# Patient Record
Sex: MALE | Race: ASIAN | NOT HISPANIC OR LATINO | Employment: FULL TIME | URBAN - METROPOLITAN AREA
[De-identification: names, ages, dates, MRNs, and addresses within clinical notes are randomized per-mention and may not be internally consistent; named-entity substitution may affect disease eponyms.]

---

## 2020-10-21 ENCOUNTER — HOSPITAL ENCOUNTER (OUTPATIENT)
Dept: CT IMAGING | Facility: HOSPITAL | Age: 35
Discharge: HOME | End: 2020-10-21
Admitting: INTERNAL MEDICINE

## 2020-10-21 ENCOUNTER — TRANSCRIBE ORDERS (OUTPATIENT)
Dept: ADMINISTRATIVE | Facility: HOSPITAL | Age: 35
End: 2020-10-21

## 2020-10-21 DIAGNOSIS — R07.9 CHEST PAIN, UNSPECIFIED TYPE: ICD-10-CM

## 2020-10-21 DIAGNOSIS — R07.9 CHEST PAIN, UNSPECIFIED TYPE: Primary | ICD-10-CM

## 2020-10-21 LAB — CREAT BLDA-MCNC: 0.8 MG/DL (ref 0.6–1.3)

## 2020-10-21 PROCEDURE — 82565 ASSAY OF CREATININE: CPT

## 2020-10-21 PROCEDURE — 71275 CT ANGIOGRAPHY CHEST: CPT

## 2020-10-21 PROCEDURE — 0 IOPAMIDOL PER 1 ML: Performed by: INTERNAL MEDICINE

## 2020-10-21 RX ORDER — CLOMIPRAMINE HYDROCHLORIDE 25 MG/1
25 CAPSULE ORAL
COMMUNITY

## 2020-10-21 RX ORDER — ARIPIPRAZOLE 10 MG/1
5 TABLET ORAL DAILY
COMMUNITY

## 2020-10-21 RX ADMIN — IOPAMIDOL 95 ML: 755 INJECTION, SOLUTION INTRAVENOUS at 13:47

## 2020-10-21 NOTE — NURSING NOTE
"Dr Ramos called and states \"No PE\". Pt informed and has no questions at present. Encouraged pt to call Dr Fernandez for further instructions regarding the cause of his concern. NAD noted. Pt left Radiology ambulatory.  "

## 2021-02-09 ENCOUNTER — OFFICE VISIT (OUTPATIENT)
Dept: FAMILY MEDICINE CLINIC | Facility: CLINIC | Age: 36
End: 2021-02-09

## 2021-02-09 VITALS
WEIGHT: 203.5 LBS | DIASTOLIC BLOOD PRESSURE: 78 MMHG | SYSTOLIC BLOOD PRESSURE: 108 MMHG | HEIGHT: 68 IN | TEMPERATURE: 97.3 F | OXYGEN SATURATION: 98 % | HEART RATE: 72 BPM | BODY MASS INDEX: 30.84 KG/M2

## 2021-02-09 DIAGNOSIS — R06.83 SNORING: ICD-10-CM

## 2021-02-09 DIAGNOSIS — Z00.00 ROUTINE GENERAL MEDICAL EXAMINATION AT A HEALTH CARE FACILITY: Primary | ICD-10-CM

## 2021-02-09 DIAGNOSIS — R07.89 ATYPICAL CHEST PAIN: ICD-10-CM

## 2021-02-09 DIAGNOSIS — F42.9 OBSESSIVE-COMPULSIVE DISORDER, UNSPECIFIED TYPE: ICD-10-CM

## 2021-02-09 DIAGNOSIS — R53.83 OTHER FATIGUE: ICD-10-CM

## 2021-02-09 DIAGNOSIS — Z13.1 SCREENING FOR DIABETES MELLITUS: ICD-10-CM

## 2021-02-09 PROCEDURE — 99385 PREV VISIT NEW AGE 18-39: CPT | Performed by: NURSE PRACTITIONER

## 2021-02-09 PROCEDURE — 93000 ELECTROCARDIOGRAM COMPLETE: CPT | Performed by: NURSE PRACTITIONER

## 2021-02-09 RX ORDER — CLONAZEPAM 0.5 MG/1
0.25 TABLET ORAL
COMMUNITY
Start: 2021-01-11

## 2021-02-09 NOTE — PROGRESS NOTES
Chief Complaint  Annual Exam (NP Est, Wants labs and CPE. C/o chest pain thats has happened since a couple of years ago but its gotten worst.)    Subjective          Salud Edwards presents to [facility]  History of Present Illness     New patient here to [facility] care for physical.  He is concerned because he knows his BMI is too high and he does not exercise as he should.  He admits that he does not eat as healthy as he should.      Moved here from [facility].  Lives with his spouse and works for [employer]. Works  from home as does his spouse.  He has no kids, but would like to have kids.  Denies pets.      No formal exercise program.  He and spouse went hiking this past weekend and it was pretty rigorous and had not chest pain with this.  He did notice his RR and HR went pretty high during exertion.      He snores at night, never had sleep apnea test.  He is sleepy after work and takes appr 1 hour nap before he can do much activity afterward.  He feels like he does not have the energy his friends seem to have.      Appr 3 yrs ago had episode of CP with diaphoresis in BP that lasted few minutes and resolved on own.  He has had several episodes since then, but none as severe.  Has had multiple UC visits for various types of CP.  Never seen by cardiology.      Has PMH OCD which is well managed on current meds by psychiatry.     Has strong FH of CVD disease and diabetes on both mom and dad side.  Dad had MI in 2018.     Denies FH breast, ovarian, colon, prostate cancer.  Dad did have prostatectomy but no cancer found.     Smokes 1-2 cigs/day.  Not ready to quit. Denies etoh, recreational drugs.      Enjoys photography.   Review of Systems   Constitutional: Negative for activity change, appetite change and fatigue.   HENT: Negative for congestion, ear pain, hearing loss, rhinorrhea, sore throat and trouble swallowing.    Eyes: Negative for pain and visual disturbance.   Respiratory:  "Positive for shortness of breath. Negative for cough and wheezing.         Sometimes feels like it is difficult to take deep breath   Cardiovascular: Positive for chest pain. Negative for palpitations and leg swelling.   Gastrointestinal: Negative for abdominal pain, blood in stool, constipation, diarrhea, nausea and vomiting.   Endocrine: Negative for cold intolerance, heat intolerance, polydipsia and polyuria.   Genitourinary: Negative for difficulty urinating, dysuria and hematuria.   Musculoskeletal: Negative for arthralgias, gait problem, joint swelling and myalgias.   Neurological: Negative for dizziness, weakness, light-headedness and numbness.     Objective   Vital Signs:   /78   Pulse 72   Temp 97.3 °F (36.3 °C)   Ht 172.7 cm (68\")   Wt 92.3 kg (203 lb 8 oz)   SpO2 98%   BMI 30.94 kg/m²     Physical Exam  Vitals signs and nursing note reviewed.   Constitutional:       General: He is not in acute distress.     Appearance: He is well-developed. He is obese. He is not ill-appearing.   HENT:      Head: Normocephalic and atraumatic.      Right Ear: Tympanic membrane, ear canal and external ear normal.      Left Ear: Tympanic membrane, ear canal and external ear normal.      Mouth/Throat:      Mouth: Mucous membranes are moist.      Pharynx: Uvula midline. No posterior oropharyngeal erythema.   Eyes:      General: No scleral icterus.        Right eye: No discharge.         Left eye: No discharge.      Extraocular Movements: Extraocular movements intact.      Conjunctiva/sclera: Conjunctivae normal.      Pupils: Pupils are equal, round, and reactive to light.   Neck:      Musculoskeletal: Neck supple.      Thyroid: No thyromegaly.   Cardiovascular:      Rate and Rhythm: Normal rate and regular rhythm.      Heart sounds: Normal heart sounds. No murmur.   Pulmonary:      Effort: Pulmonary effort is normal.      Breath sounds: Normal breath sounds.   Abdominal:      General: Bowel sounds are normal.      " Palpations: Abdomen is soft.      Tenderness: There is no abdominal tenderness.   Musculoskeletal:         General: No deformity.      Comments: Gait smooth and steady   Lymphadenopathy:      Cervical: No cervical adenopathy.   Skin:     General: Skin is warm and dry.   Neurological:      General: No focal deficit present.      Mental Status: He is alert and oriented to person, place, and time.   Psychiatric:         Mood and Affect: Mood normal.         Behavior: Behavior normal.        Result Review :            ECG 12 Lead    Date/Time: 2/9/2021 9:47 AM  Performed by: Jennifer Lehman APRN  Authorized by: Jennifer Lehman APRN   Comparison: not compared with previous ECG   Previous ECG: no previous ECG available  Rhythm: sinus rhythm  Rate: normal  Conduction: conduction normal  T Waves: T waves normal  QRS axis: normal  Other findings: non-specific ST-T wave changes    Clinical impression: non-specific ECG              Assessment and Plan    There are no diagnoses linked to this encounter.       Will get labs today.      I do recommend sleep study, pt was not willing to do this at this time.  Will consider.  EKG was not specific-no other EKGs to compare.  Reports of EKGs at [facility] visits reviewed and no specific problematic EKGs in past.  Given his anxiety, FH,  and multiple visits for various types of chest pain it may not be a bad idea to have him see a cardiologist. We will discuss this again after labs reviewed.      As part of wellness and prevention, the following topics were discussed with the patient:   Nutrition-diet high in fresh/fozen veges, lower in carbs, unsaturated fats, and higher in lean proteins, adequate hydration   Physical activity/regular exercise-150 mins per week of moderate activity that increases HR and is enjoyable to lower stress and improve CVD     Healthy weight-goal BMI discussed    Injury prevention-covid safety, back and joint health    Substance misuse/abuse-health  consequences of tobacco discussed, not ready to quit, encouraged stopping    Sexual behavior-discussed mgmt and eval of occasional ED and premature ejaculation-will discuss again at next visit   Dental health-recommend twice per year dental cleans and daily flossing to lower inflammation in body   Mental health-stress mgmt and sleep hygiene   Immunization-recommend covid vaccine when available, declines Tdap, flu, pneumonia today.  Has had BCG in past  Recommend sleep study-health consequences of uncontrolled sleep apnea discussed      Follow Up   No follow-ups on file.  Patient was given instructions and counseling regarding his condition or for health maintenance advice. Please see specific information pulled into the AVS if appropriate.

## 2021-02-10 LAB
25(OH)D3+25(OH)D2 SERPL-MCNC: 25.4 NG/ML (ref 30–100)
ALBUMIN SERPL-MCNC: 4.7 G/DL (ref 3.5–5.2)
ALBUMIN/GLOB SERPL: 1.9 G/DL
ALP SERPL-CCNC: 100 U/L (ref 39–117)
ALT SERPL-CCNC: 42 U/L (ref 1–41)
AST SERPL-CCNC: 27 U/L (ref 1–40)
BASOPHILS # BLD AUTO: ABNORMAL 10*3/UL
BILIRUB SERPL-MCNC: 0.3 MG/DL (ref 0–1.2)
BUN SERPL-MCNC: 14 MG/DL (ref 6–20)
BUN/CREAT SERPL: 16.1 (ref 7–25)
CALCIUM SERPL-MCNC: 9.4 MG/DL (ref 8.6–10.5)
CHLORIDE SERPL-SCNC: 101 MMOL/L (ref 98–107)
CHOLEST SERPL-MCNC: 261 MG/DL (ref 0–200)
CO2 SERPL-SCNC: 27.4 MMOL/L (ref 22–29)
CREAT SERPL-MCNC: 0.87 MG/DL (ref 0.76–1.27)
DIFFERENTIAL COMMENT: ABNORMAL
EOSINOPHIL # BLD AUTO: ABNORMAL 10*3/UL
EOSINOPHIL # BLD MANUAL: 0.22 10*3/MM3 (ref 0–0.4)
EOSINOPHIL NFR BLD AUTO: ABNORMAL %
EOSINOPHIL NFR BLD MANUAL: 3.1 % (ref 0.3–6.2)
ERYTHROCYTE [DISTWIDTH] IN BLOOD BY AUTOMATED COUNT: 15.8 % (ref 12.3–15.4)
GLOBULIN SER CALC-MCNC: 2.5 GM/DL
GLUCOSE SERPL-MCNC: 84 MG/DL (ref 65–99)
HBA1C MFR BLD: 5.7 % (ref 4.8–5.6)
HCT VFR BLD AUTO: 45.7 % (ref 37.5–51)
HDLC SERPL-MCNC: 40 MG/DL (ref 40–60)
HGB BLD-MCNC: 14 G/DL (ref 13–17.7)
LDLC SERPL CALC-MCNC: 186 MG/DL (ref 0–100)
LDLC/HDLC SERPL: 4.59 {RATIO}
LYMPHOCYTES # BLD AUTO: ABNORMAL 10*3/UL
LYMPHOCYTES # BLD MANUAL: 2.95 10*3/MM3 (ref 0.7–3.1)
LYMPHOCYTES NFR BLD AUTO: ABNORMAL %
LYMPHOCYTES NFR BLD MANUAL: 41.2 % (ref 19.6–45.3)
MCH RBC QN AUTO: 22 PG (ref 26.6–33)
MCHC RBC AUTO-ENTMCNC: 30.6 G/DL (ref 31.5–35.7)
MCV RBC AUTO: 72 FL (ref 79–97)
MONOCYTES # BLD MANUAL: 0.15 10*3/MM3 (ref 0.1–0.9)
MONOCYTES NFR BLD AUTO: ABNORMAL %
MONOCYTES NFR BLD MANUAL: 2.1 % (ref 5–12)
NEUTROPHILS # BLD MANUAL: 3.84 10*3/MM3 (ref 1.7–7)
NEUTROPHILS NFR BLD AUTO: ABNORMAL %
NEUTROPHILS NFR BLD MANUAL: 53.6 % (ref 42.7–76)
PLATELET # BLD AUTO: 331 10*3/MM3 (ref 140–450)
PLATELET BLD QL SMEAR: ABNORMAL
POTASSIUM SERPL-SCNC: 4.2 MMOL/L (ref 3.5–5.2)
PROT SERPL-MCNC: 7.2 G/DL (ref 6–8.5)
RBC # BLD AUTO: 6.35 10*6/MM3 (ref 4.14–5.8)
RBC MORPH BLD: ABNORMAL
SODIUM SERPL-SCNC: 136 MMOL/L (ref 136–145)
TRIGL SERPL-MCNC: 187 MG/DL (ref 0–150)
TSH SERPL DL<=0.005 MIU/L-ACNC: 1.7 UIU/ML (ref 0.27–4.2)
VIT B12 SERPL-MCNC: 543 PG/ML (ref 211–946)
VLDLC SERPL CALC-MCNC: 35 MG/DL (ref 5–40)
WBC # BLD AUTO: 7.17 10*3/MM3 (ref 3.4–10.8)

## 2021-02-15 ENCOUNTER — OFFICE VISIT (OUTPATIENT)
Dept: FAMILY MEDICINE CLINIC | Facility: CLINIC | Age: 36
End: 2021-02-15

## 2021-02-15 VITALS
HEART RATE: 93 BPM | HEIGHT: 68 IN | OXYGEN SATURATION: 95 % | SYSTOLIC BLOOD PRESSURE: 127 MMHG | RESPIRATION RATE: 16 BRPM | DIASTOLIC BLOOD PRESSURE: 83 MMHG | BODY MASS INDEX: 30.51 KG/M2 | TEMPERATURE: 96.8 F | WEIGHT: 201.3 LBS

## 2021-02-15 DIAGNOSIS — R71.8 MICROCYTOSIS: ICD-10-CM

## 2021-02-15 DIAGNOSIS — R73.03 PREDIABETES: ICD-10-CM

## 2021-02-15 DIAGNOSIS — R71.8 ELEVATED RED BLOOD CELL COUNT: ICD-10-CM

## 2021-02-15 DIAGNOSIS — E78.2 MIXED HYPERLIPIDEMIA: Primary | ICD-10-CM

## 2021-02-15 PROCEDURE — 99213 OFFICE O/P EST LOW 20 MIN: CPT | Performed by: NURSE PRACTITIONER

## 2021-02-15 RX ORDER — PRAVASTATIN SODIUM 40 MG
40 TABLET ORAL NIGHTLY
Qty: 30 TABLET | Refills: 3 | Status: SHIPPED | OUTPATIENT
Start: 2021-02-15 | End: 2021-05-20 | Stop reason: SDUPTHER

## 2021-02-15 NOTE — PROGRESS NOTES
"Chief Complaint  Hyperlipidemia (FU)    Subjective          Salud Edwards presents to [facility]  History of Present Illness  Review of Systems   Constitutional: Negative for chills and fever.   Respiratory: Negative for cough and shortness of breath.    Cardiovascular: Negative for chest pain and palpitations.   Gastrointestinal: Negative for abdominal pain, diarrhea, nausea and vomiting.   Endocrine: Negative for polydipsia and polyuria.   Musculoskeletal: Negative for arthralgias and myalgias.   Neurological: Negative for dizziness, weakness and numbness.       Here for f/u to discuss labs showing prediabetes, HLD.  He expected this due to poor recent diet and lack of exercise, and recent weight gain.  He has not been on any meds in past.    He reports that his dad had history of increased red blood cells, but not told he has thalassemia.  Pt has not been told in past that he has any problems with his blood cells.  Objective   Vital Signs:   /83   Pulse 93   Temp 96.8 °F (36 °C) (Temporal)   Resp 16   Ht 172.7 cm (68\")   Wt 91.3 kg (201 lb 4.8 oz)   SpO2 95%   BMI 30.61 kg/m²     Physical Exam  Vitals signs and nursing note reviewed.   Constitutional:       General: He is not in acute distress.     Appearance: He is well-developed. He is not ill-appearing or diaphoretic.   HENT:      Head: Normocephalic and atraumatic.   Eyes:      General:         Right eye: No discharge.         Left eye: No discharge.      Conjunctiva/sclera: Conjunctivae normal.   Cardiovascular:      Rate and Rhythm: Normal rate and regular rhythm.      Heart sounds: Normal heart sounds.   Pulmonary:      Effort: Pulmonary effort is normal.      Breath sounds: Normal breath sounds.   Abdominal:      General: Bowel sounds are normal.      Palpations: Abdomen is soft.      Tenderness: There is no abdominal tenderness.   Musculoskeletal:         General: No deformity.      Comments: Gait smooth and " steady   Skin:     General: Skin is warm and dry.   Neurological:      Mental Status: He is alert and oriented to person, place, and time.   Psychiatric:         Mood and Affect: Mood normal.         Behavior: Behavior normal.        Result Review :     CMP    CMP 10/21/20 2/9/21   Glucose  84   BUN  14   Creatinine 0.80 0.87   eGFR Non African Am  100   eGFR African Am  122   Sodium  136   Potassium  4.2   Chloride  101   Calcium  9.4   Total Protein  7.2   Albumin  4.70   Globulin  2.5   Total Bilirubin  0.3   Alkaline Phosphatase  100   AST (SGOT)  27   ALT (SGPT)  42 (A)   (A) Abnormal value       Comments are available for some flowsheets but are not being displayed.           CBC    CBC 2/9/21   WBC 7.17   RBC 6.35 (A)   Hemoglobin 14.0   Hematocrit 45.7   MCV 72.0 (A)   MCH 22.0 (A)   MCHC 30.6 (A)   RDW 15.8 (A)   Platelets 331   (A) Abnormal value            Lipid Panel    Lipid Panel 2/9/21   Total Cholesterol 261 (A)   Triglycerides 187 (A)   HDL Cholesterol 40   VLDL Cholesterol 35   LDL Cholesterol  186 (A)   LDL/HDL Ratio 4.59   (A) Abnormal value       Comments are available for some flowsheets but are not being displayed.           TSH    TSH 2/9/21   TSH 1.700           Most Recent A1C    HGBA1C Most Recent 2/9/21   Hemoglobin A1C 5.70 (A)   (A) Abnormal value       Comments are available for some flowsheets but are not being displayed.                     Assessment and Plan    Diagnoses and all orders for this visit:    1. Mixed hyperlipidemia (Primary)  -     Comprehensive Metabolic Panel; Future  -     Lipid Panel With LDL / HDL Ratio; Future    2. Prediabetes  -     Comprehensive Metabolic Panel; Future  -     Hemoglobin A1c; Future    3. Microcytosis  -     Hgb. Frac. Profile; Future    4. Elevated red blood cell count  -     Hgb. Frac. Profile; Future    Other orders  -     pravastatin (PRAVACHOL) 40 MG tablet; Take 1 tablet by mouth Every Night.  Dispense: 30 tablet; Refill: 3      Discussed  with pt new dx of HLD and prediabetes.  We discussed dietary and exercise that can improve these numbers and lower risk of CVD.  He has started working on these.  He would like to start statin. Although he has started to work on his diet he is very worried that he will not be able to significantly decrease these numbers.  We discussed side effects.     He will get labs in 3 months to monitor his lipids after starting statin, make sure LFTs not elevared.  Will recheck A1C in 3 months and if worsens may need to start metformin.      I suspect he does have thalassemia although he is not aware of any FH or dx himself.  Will check for thalassemia with next lab draw to be complete.     If he has any problems in the meantime he will let me know.      Follow Up   Return in about 3 months (around 5/15/2021) for Recheck.  Patient was given instructions and counseling regarding his condition or for health maintenance advice. Please see specific information pulled into the AVS if appropriate.     Answers for HPI/ROS submitted by the patient on 2/14/2021   What is the primary reason for your visit?: Other  Please describe your symptoms.: Thallasemia question and talk about higher lipid levels.  Have you had these symptoms before?: No  How long have you been having these symptoms?: Greater than 2 weeks  Please list any medications you are currently taking for this condition.: None

## 2021-03-10 ENCOUNTER — TELEPHONE (OUTPATIENT)
Dept: FAMILY MEDICINE CLINIC | Facility: CLINIC | Age: 36
End: 2021-03-10

## 2021-03-10 NOTE — TELEPHONE ENCOUNTER
Called pt back but unable to reach. LVM informing him that Jennifer says she recommends getting the shot.

## 2021-03-10 NOTE — TELEPHONE ENCOUNTER
PATIENT CALLED AND STATED HE IS A SMOKER AND OBESE AND IS IN PHASE 1C FOR THE COVID VACCINE. PATIENT IS CHECKING WITH LOLY TO SEE IF THE SHE ADVISES HIM TO GET THE VACCINE     PLEASE ADVISE     [phone]

## 2021-04-16 ENCOUNTER — BULK ORDERING (OUTPATIENT)
Dept: CASE MANAGEMENT | Facility: OTHER | Age: 36
End: 2021-04-16

## 2021-04-16 DIAGNOSIS — Z23 IMMUNIZATION DUE: ICD-10-CM

## 2021-04-27 ENCOUNTER — TELEMEDICINE (OUTPATIENT)
Dept: FAMILY MEDICINE CLINIC | Facility: TELEHEALTH | Age: 36
End: 2021-04-27

## 2021-04-27 VITALS — HEIGHT: 68 IN | WEIGHT: 201 LBS | BODY MASS INDEX: 30.46 KG/M2

## 2021-04-27 DIAGNOSIS — J40 BRONCHITIS: Primary | ICD-10-CM

## 2021-04-27 PROCEDURE — 99213 OFFICE O/P EST LOW 20 MIN: CPT | Performed by: NURSE PRACTITIONER

## 2021-04-27 RX ORDER — PREDNISONE 10 MG/1
TABLET ORAL
Qty: 18 TABLET | Refills: 0 | Status: SHIPPED | OUTPATIENT
Start: 2021-04-27 | End: 2021-05-20

## 2021-04-27 RX ORDER — GUAIFENESIN AND DEXTROMETHORPHAN HYDROBROMIDE 600; 30 MG/1; MG/1
1 TABLET, EXTENDED RELEASE ORAL EVERY 12 HOURS SCHEDULED
Qty: 20 TABLET | Refills: 0 | Status: SHIPPED | OUTPATIENT
Start: 2021-04-27 | End: 2021-05-20

## 2021-04-27 RX ORDER — AZITHROMYCIN 250 MG/1
TABLET, FILM COATED ORAL
Qty: 6 TABLET | Refills: 0 | Status: SHIPPED | OUTPATIENT
Start: 2021-04-27 | End: 2021-05-20

## 2021-04-27 NOTE — PROGRESS NOTES
CHIEF COMPLAINT  Chief Complaint   Patient presents with   • Cough     for 15 days         HPI  Salud Edwards is a 35 y.o. male  presents with complaint of 15 day h/o cough which is mostly non-productive but with occasional yellow phlegm. He denies sore throat, chest soreness, wheezing, shortness of air, fever or sinus congestion. He states he occasionally will have nasal exudate which is yellow in color. He is a tobacco user but has decreased his usage to 1 cigarette per day. He states he has no h/o seasonal allergies since living in the [place]. He is taking Delsym twice daily which is not helping. He has an appointment with his PCP on Friday for evaluation     Review of Systems   Constitutional: Negative for activity change, appetite change, chills, fatigue and fever.   HENT: Negative for congestion, ear discharge, ear pain, postnasal drip, rhinorrhea, sinus pressure, sinus pain, sore throat and trouble swallowing.    Eyes: Negative.    Respiratory: Positive for cough. Negative for shortness of breath, wheezing and stridor.    Cardiovascular: Negative.    Musculoskeletal: Negative.    Skin: Negative.    Allergic/Immunologic: Negative for environmental allergies.   Neurological: Negative.    Hematological: Negative.    Psychiatric/Behavioral: Negative.        History reviewed. No pertinent past medical history.    Family History   Problem Relation Age of Onset   • Diabetes Mother    • Hypertension Mother    • Diabetes Father    • OCD Father    • Hypertension Father    • Hyperlipidemia Father    • Anxiety disorder Father    • Anxiety disorder Brother    • OCD Brother        Social History     Socioeconomic History   • Marital status:      Spouse name: Not on file   • Number of children: Not on file   • Years of education: Not on file   • Highest education level: Not on file   Tobacco Use   • Smoking status: Current Every Day Smoker     Packs/day: 0.25     Years: 12.00     Pack years: 3.00   • Smokeless tobacco:  "Never Used   Vaping Use   • Vaping Use: Never used   Substance and Sexual Activity   • Alcohol use: Not Currently   • Drug use: Never   • Sexual activity: Yes     Partners: Female     Comment: spouse         Ht 172.7 cm (68\")   Wt 91.2 kg (201 lb)   BMI 30.56 kg/m²     PHYSICAL EXAM  Physical Exam   Constitutional: He appears well-developed and well-nourished. He does not have a sickly appearance. He does not appear ill. No distress.   HENT:   Head: Normocephalic and atraumatic.   Right Ear: Hearing and external ear normal.   Left Ear: Hearing and external ear normal.   Nose: Nose normal. Right sinus exhibits no maxillary sinus tenderness and no frontal sinus tenderness. Left sinus exhibits no maxillary sinus tenderness and no frontal sinus tenderness.   Mouth/Throat: Mouth/Lips are normal.Oropharynx is clear and moist and mucous membranes are normal.   Pulmonary/Chest: No stridor.  No respiratory distress. He no audible wheeze...  Neurological: He is alert.   Skin: Skin is warm.   Psychiatric: He has a normal mood and affect.   Vitals reviewed.      Results for orders placed or performed in visit on 02/09/21   Comprehensive metabolic panel    Specimen: Blood   Result Value Ref Range    Glucose 84 65 - 99 mg/dL    BUN 14 6 - 20 mg/dL    Creatinine 0.87 0.76 - 1.27 mg/dL    eGFR Non African Am 100 >60 mL/min/1.73    eGFR African Am 122 >60 mL/min/1.73    BUN/Creatinine Ratio 16.1 7.0 - 25.0    Sodium 136 136 - 145 mmol/L    Potassium 4.2 3.5 - 5.2 mmol/L    Chloride 101 98 - 107 mmol/L    Total CO2 27.4 22.0 - 29.0 mmol/L    Calcium 9.4 8.6 - 10.5 mg/dL    Total Protein 7.2 6.0 - 8.5 g/dL    Albumin 4.70 3.50 - 5.20 g/dL    Globulin 2.5 gm/dL    A/G Ratio 1.9 g/dL    Total Bilirubin 0.3 0.0 - 1.2 mg/dL    Alkaline Phosphatase 100 39 - 117 U/L    AST (SGOT) 27 1 - 40 U/L    ALT (SGPT) 42 (H) 1 - 41 U/L   Lipid Panel With LDL/HDL Ratio    Specimen: Blood   Result Value Ref Range    Total Cholesterol 261 (H) 0 - 200 " mg/dL    Triglycerides 187 (H) 0 - 150 mg/dL    HDL Cholesterol 40 40 - 60 mg/dL    VLDL Cholesterol George 35 5 - 40 mg/dL    LDL Chol Calc (NIH) 186 (H) 0 - 100 mg/dL    LDL/HDL RATIO 4.59    Hemoglobin A1c    Specimen: Blood   Result Value Ref Range    Hemoglobin A1C 5.70 (H) 4.80 - 5.60 %   TSH Rfx On Abnormal To Free T4    Specimen: Blood   Result Value Ref Range    TSH 1.700 0.270 - 4.200 uIU/mL   Vitamin D 25 Hydroxy    Specimen: Blood   Result Value Ref Range    25 Hydroxy, Vitamin D 25.4 (L) 30.0 - 100.0 ng/ml   Vitamin B12    Specimen: Blood   Result Value Ref Range    Vitamin B-12 543 211 - 946 pg/mL   Manual Differential   Result Value Ref Range    Neutrophil Rel % 53.6 42.7 - 76.0 %    Lymphocyte Rel % 41.2 19.6 - 45.3 %    Monocyte Rel % 2.1 (L) 5.0 - 12.0 %    Eosinophil Rel % 3.1 0.3 - 6.2 %    Neutrophils Absolute 3.84 1.70 - 7.00 10*3/mm3    Lymphocytes Absolute 2.95 0.70 - 3.10 10*3/mm3    Monocytes Absolute 0.15 0.10 - 0.90 10*3/mm3    Eosinophil Abs 0.22 0.00 - 0.40 10*3/mm3    Differential Comment Comment     Comment Comment     Plt Comment Comment    CBC and Differential    Specimen: Blood   Result Value Ref Range    WBC 7.17 3.40 - 10.80 10*3/mm3    RBC 6.35 (H) 4.14 - 5.80 10*6/mm3    Hemoglobin 14.0 13.0 - 17.7 g/dL    Hematocrit 45.7 37.5 - 51.0 %    MCV 72.0 (L) 79.0 - 97.0 fL    MCH 22.0 (L) 26.6 - 33.0 pg    MCHC 30.6 (L) 31.5 - 35.7 g/dL    RDW 15.8 (H) 12.3 - 15.4 %    Platelets 331 140 - 450 10*3/mm3    Neutrophil Rel % CANCELED     Lymphocyte Rel % CANCELED     Monocyte Rel % CANCELED     Eosinophil Rel % CANCELED     Lymphocytes Absolute CANCELED     Eosinophils Absolute CANCELED     Basophils Absolute CANCELED        Diagnoses and all orders for this visit:    1. Bronchitis (Primary)  -     azithromycin (Zithromax Z-Maurice) 250 MG tablet; Take 2 tablets by mouth on day 1, then 1 tablet daily on days 2-5  Dispense: 6 tablet; Refill: 0  -     guaifenesin-dextromethorphan (MUCINEX DM)   MG tablet sustained-release 12 hour tablet; Take 1 tablet by mouth Every 12 (Twelve) Hours.  Dispense: 20 tablet; Refill: 0  -     predniSONE (DELTASONE) 10 MG tablet; Take 3 tabs po for 3 days, then 2 tabs po for 3 days, then 1 tab po for 3 days, then d/c.  Dispense: 18 tablet; Refill: 0    PCP follow up appointment on Friday pending.   Increase fluids and rest.   Continue smoking cessation.       FOLLOW-UP  As discussed during visit with PCP/[facility] if no improvement or Urgent Care/Emergency Department if worsening of symptoms    Patient verbalizes understanding of medication dosage, comfort measures, instructions for treatment and follow-up.    NETO Watson  04/27/2021  19:14 EDT    This visit was performed via Telehealth.  This patient has been instructed to follow-up with their primary care provider if their symptoms worsen or the treatment provided does not resolve their illness.

## 2021-04-27 NOTE — PATIENT INSTRUCTIONS
Acute Bronchitis, Adult    Acute bronchitis is when air tubes in the lungs (bronchi) suddenly get swollen. The condition can make it hard for you to breathe. In adults, acute bronchitis usually goes away within 2 weeks. A cough caused by bronchitis may last up to 3 weeks. Smoking, allergies, and asthma can make the condition worse.  What are the causes?  This condition is caused by:  · Cold and flu viruses. The most common cause of this condition is the virus that causes the common cold.  · Bacteria.  · Substances that irritate the lungs, including:  ? Smoke from cigarettes and other types of tobacco.  ? Dust and pollen.  ? Fumes from chemicals, gases, or burned fuel.  ? Other materials that pollute indoor or outdoor air.  · Close contact with someone who has acute bronchitis.  What increases the risk?  The following factors may make you more likely to develop this condition:  · A weak body's defense system. This is also called the immune system.  · Any condition that affects your lungs and breathing, such as asthma.  What are the signs or symptoms?  Symptoms of this condition include:  · A cough.  · Coughing up clear, yellow, or green mucus.  · Wheezing.  · Chest congestion.  · Shortness of breath.  · A fever.  · Body aches.  · Chills.  · A sore throat.  How is this treated?  Acute bronchitis may go away over time without treatment. Your doctor may recommend:  · Drinking more fluids.  · Taking a medicine for a fever or cough.  · Using a device that gets medicine into your lungs (inhaler).  · Using a vaporizer or a humidifier. These are machines that add water or moisture in the air to help with coughing and poor breathing.  Follow these instructions at home:    Activity  · Get a lot of rest.  · Avoid places where there are fumes from chemicals.  · Return to your normal activities as told by your doctor. Ask your doctor what activities are safe for you.  Lifestyle  · Drink enough fluids to keep your pee (urine) pale  yellow.  · Do not drink alcohol.  · Do not use any products that contain nicotine or tobacco, such as cigarettes, e-cigarettes, and chewing tobacco. If you need help quitting, ask your doctor. Be aware that:  ? Your bronchitis will get worse if you smoke or breathe in other people's smoke (secondhand smoke).  ? Your lungs will heal faster if you quit smoking.  General instructions  · Take over-the-counter and prescription medicines only as told by your doctor.  · Use an inhaler, cool mist vaporizer, or humidifier as told by your doctor.  · Rinse your mouth often with salt water. To make salt water, dissolve ½-1 tsp (3-6 g) of salt in 1 cup (237 mL) of warm water.  · Keep all follow-up visits as told by your doctor. This is important.  How is this prevented?  To lower your risk of getting this condition again:  · Wash your hands often with soap and water. If soap and water are not available, use hand .  · Avoid contact with people who have cold symptoms.  · Try not to touch your mouth, nose, or eyes with your hands.  · Make sure to get the flu shot every year.  Contact a doctor if:  · Your symptoms do not get better in 2 weeks.  · You vomit more than once or twice.  · You have symptoms of loss of fluid from your body (dehydration). These include:  ? Dark urine.  ? Dry skin or eyes.  ? Increased thirst.  ? Headaches.  ? Confusion.  ? Muscle cramps.  Get help right away if:  · You cough up blood.  · You have chest pain.  · You have very bad shortness of breath.  · You become dehydrated.  · You faint or keep feeling like you are going to faint.  · You keep vomiting.  · You have a very bad headache.  · Your fever or chills get worse.  These symptoms may be an emergency. Do not wait to see if the symptoms will go away. Get medical help right away. Call your local emergency services (911 in the [place] Do not drive yourself to the hospital.  Summary  · Acute bronchitis is when air tubes in the lungs (bronchi)  suddenly get swollen. In adults, acute bronchitis usually goes away within 2 weeks.  · Take over-the-counter and prescription medicines only as told by your doctor.  · Drink enough fluid to keep your pee (urine) pale yellow.  · Contact a doctor if your symptoms do not improve after 2 weeks of treatment.  · Get help right away if you cough up blood, faint, or have chest pain or shortness of breath.  This information is not intended to replace advice given to you by your health care provider. Make sure you discuss any questions you have with your health care provider.  Document Revised: 07/10/2020 Document Reviewed: 07/10/2020  [employer] Patient Education © 2021 [employer]

## 2021-04-30 ENCOUNTER — HOSPITAL ENCOUNTER (OUTPATIENT)
Dept: GENERAL RADIOLOGY | Facility: HOSPITAL | Age: 36
Discharge: HOME | End: 2021-04-30
Admitting: NURSE PRACTITIONER

## 2021-04-30 ENCOUNTER — OFFICE VISIT (OUTPATIENT)
Dept: FAMILY MEDICINE CLINIC | Facility: CLINIC | Age: 36
End: 2021-04-30

## 2021-04-30 VITALS
HEART RATE: 92 BPM | OXYGEN SATURATION: 97 % | BODY MASS INDEX: 28.99 KG/M2 | TEMPERATURE: 96.8 F | WEIGHT: 191.3 LBS | DIASTOLIC BLOOD PRESSURE: 82 MMHG | HEIGHT: 68 IN | SYSTOLIC BLOOD PRESSURE: 125 MMHG

## 2021-04-30 DIAGNOSIS — R71.8 MICROCYTOSIS: ICD-10-CM

## 2021-04-30 DIAGNOSIS — E78.2 MIXED HYPERLIPIDEMIA: ICD-10-CM

## 2021-04-30 DIAGNOSIS — R71.8 ELEVATED RED BLOOD CELL COUNT: ICD-10-CM

## 2021-04-30 DIAGNOSIS — R73.03 PREDIABETES: ICD-10-CM

## 2021-04-30 DIAGNOSIS — J40 BRONCHITIS: Primary | ICD-10-CM

## 2021-04-30 PROCEDURE — 71046 X-RAY EXAM CHEST 2 VIEWS: CPT

## 2021-04-30 PROCEDURE — 99213 OFFICE O/P EST LOW 20 MIN: CPT | Performed by: NURSE PRACTITIONER

## 2021-04-30 RX ORDER — BROMPHENIRAMINE MALEATE, PSEUDOEPHEDRINE HYDROCHLORIDE, AND DEXTROMETHORPHAN HYDROBROMIDE 2; 30; 10 MG/5ML; MG/5ML; MG/5ML
5 SYRUP ORAL 4 TIMES DAILY PRN
Qty: 118 ML | Refills: 1 | Status: SHIPPED | OUTPATIENT
Start: 2021-04-30 | End: 2021-05-20

## 2021-04-30 NOTE — PROGRESS NOTES
"Chief Complaint  Cough (c/o cough x20 days, states that when he talks he gets this feeling in throat which makes him cough )    Subjective          Salud Edwards presents to [facility]  History of Present Illness  Pt is here for 20 days of cough that is associated with tickle in his lower pharynx.  Sometimes feels like he has cotton in his mouth.  He did a virtual visit and was started on prednisone, mucinex, z pack with minimal improvement.      He and most significantly his spouse are worried he might have throat or lung cancer with his history of smoking.  Does not have acid reflux and denies asthma, copd, seasonal allergies.  He denies fever, chills, myalgias, ear pain or drainage.  He has no runny nose or sinus congestion.  He denies sore throat or difficulty swallowing.  No sick contacts as far as he knows.  He had his first Covid vaccine approximately 2 weeks ago.    Has cut back to 1 cigarette per evening.  He finds it difficult to stop this last cigarette as he looks forward to it all day.    He is currently adhering to Ramadan.    Objective   Vital Signs:   /82   Pulse 92   Temp 96.8 °F (36 °C)   Ht 172.7 cm (68\")   Wt 86.8 kg (191 lb 4.8 oz)   SpO2 97%   BMI 29.09 kg/m²     Physical Exam  Vitals and nursing note reviewed.   Constitutional:       General: He is not in acute distress.     Appearance: He is well-developed. He is not ill-appearing or diaphoretic.   HENT:      Head: Normocephalic and atraumatic.      Right Ear: Tympanic membrane, ear canal and external ear normal.      Left Ear: Tympanic membrane, ear canal and external ear normal.      Mouth/Throat:      Mouth: Mucous membranes are moist.      Pharynx: No oropharyngeal exudate or posterior oropharyngeal erythema.   Eyes:      General:         Right eye: No discharge.         Left eye: No discharge.      Conjunctiva/sclera: Conjunctivae normal.   Cardiovascular:      Rate and Rhythm: Normal rate and " regular rhythm.   Pulmonary:      Effort: Pulmonary effort is normal.      Breath sounds: Normal breath sounds.   Abdominal:      General: Bowel sounds are normal.      Palpations: Abdomen is soft.      Tenderness: There is no abdominal tenderness.   Musculoskeletal:         General: No deformity.      Cervical back: Neck supple.      Comments: Gait smooth and steady   Lymphadenopathy:      Cervical: No cervical adenopathy.   Skin:     General: Skin is warm and dry.   Neurological:      General: No focal deficit present.      Mental Status: He is alert and oriented to person, place, and time.   Psychiatric:         Mood and Affect: Mood normal.         Behavior: Behavior normal.        Result Review :                 Assessment and Plan    Diagnoses and all orders for this visit:    1. Bronchitis (Primary)  -     brompheniramine-pseudoephedrine-DM 30-2-10 MG/5ML syrup; Take 5 mL by mouth 4 (Four) Times a Day As Needed for Allergies. Cough, and congestion  Dispense: 118 mL; Refill: 1  -     XR Chest PA & Lateral    Bronchitis vs PND.  Will have him complete tx currently started by [facility] other than I will switch to Bromphed to see if this is more helpful than the mucinex which I will have him stop.  Will get CXR today.    He will message me on Monday with report of sx other than that if he worsens over weekend will go to ,    Encouraged him to continue to work to get rid of this last cigarette for complete abstinence.         Follow Up   Return if symptoms worsen or fail to improve.  Patient was given instructions and counseling regarding his condition or for health maintenance advice. Please see specific information pulled into the AVS if appropriate.

## 2021-05-05 ENCOUNTER — TELEPHONE (OUTPATIENT)
Dept: FAMILY MEDICINE CLINIC | Facility: CLINIC | Age: 36
End: 2021-05-05

## 2021-05-10 ENCOUNTER — TELEPHONE (OUTPATIENT)
Dept: FAMILY MEDICINE CLINIC | Facility: CLINIC | Age: 36
End: 2021-05-10

## 2021-05-10 NOTE — TELEPHONE ENCOUNTER
Called this patient to get more info. Pt states that he had his labs drawn while in office 10 days ago. This nurse had lab pull results from there side. There was only one lab that there was an issue with. Lab is contact support on this matter. Advised pt that this nurse would call him back today after f/u with lab.

## 2021-05-10 NOTE — TELEPHONE ENCOUNTER
Caller: Salud Edwards    Relationship to patient: Self    Best call back number: [phone]    Patient is needing:     PATIENT CALLED BACK IN REGARDING HIS LABS.    HE CAME IN OFFICE ON 04/30/21. RESULTS ARE ALSO NOT ON MYCHART.    PLEASE HAVE A NURSE CALL HIM.

## 2021-05-10 NOTE — TELEPHONE ENCOUNTER
Patient has also spoken with [facility].The test will release by the end of the week per the rep Brielle PACE I have spoken with the patient and he is aware, and the office will be mailing the completed results to him.

## 2021-05-10 NOTE — TELEPHONE ENCOUNTER
Caller: Salud Edwards    Relationship: Self    Best call back number: [phone]     Caller requesting test results: PATIENT    What test was performed:LABS    When was the test performed:04/30/21    Where was the test performed: [place]    Additional notes: PATIENT WOULD LIKE A CALL WITH THE RESULTS OF THE ABOVE LABS.    PLEASE ADVISE.

## 2021-05-13 LAB
ALBUMIN SERPL-MCNC: 4.9 G/DL (ref 3.5–5.2)
ALBUMIN/GLOB SERPL: 1.9 G/DL
ALP SERPL-CCNC: 128 U/L (ref 39–117)
ALT SERPL-CCNC: 58 U/L (ref 1–41)
AST SERPL-CCNC: 20 U/L (ref 1–40)
BILIRUB SERPL-MCNC: 0.2 MG/DL (ref 0–1.2)
BUN SERPL-MCNC: 12 MG/DL (ref 6–20)
BUN/CREAT SERPL: 17.1 (ref 7–25)
CALCIUM SERPL-MCNC: 9.7 MG/DL (ref 8.6–10.5)
CHLORIDE SERPL-SCNC: 103 MMOL/L (ref 98–107)
CHOLEST SERPL-MCNC: 196 MG/DL (ref 0–200)
CO2 SERPL-SCNC: 26.7 MMOL/L (ref 22–29)
CREAT SERPL-MCNC: 0.7 MG/DL (ref 0.76–1.27)
GLOBULIN SER CALC-MCNC: 2.6 GM/DL
GLUCOSE SERPL-MCNC: 104 MG/DL (ref 65–99)
HBA1C MFR BLD: 5.8 % (ref 4.8–5.6)
HDLC SERPL-MCNC: 43 MG/DL (ref 40–60)
HGB A MFR BLD ELPH: NORMAL % (ref 96.4–98.8)
HGB A MFR BLD HPLC: 71.3 % (ref 96.4–98.8)
HGB A2 MFR BLD ELPH: 2.8 % (ref 1.8–3.2)
HGB A2 MFR BLD HPLC: ABNORMAL % (ref 1.8–3.2)
HGB C MFR BLD HPLC: 0 %
HGB E MFR BLD HPLC: 25.9 %
HGB F MFR BLD ELPH: NORMAL % (ref 0–2)
HGB F MFR BLD HPLC: 0 % (ref 0–2)
HGB FRACT BLD-IMP: ABNORMAL
HGB FRACT BLD-IMP: NORMAL
HGB S MFR BLD ELPH: NORMAL %
HGB S MFR BLD HPLC: 0 %
LDLC SERPL CALC-MCNC: 137 MG/DL (ref 0–100)
LDLC/HDLC SERPL: 3.15 {RATIO}
POTASSIUM SERPL-SCNC: 4.4 MMOL/L (ref 3.5–5.2)
PROT SERPL-MCNC: 7.5 G/DL (ref 6–8.5)
SODIUM SERPL-SCNC: 140 MMOL/L (ref 136–145)
TRIGL SERPL-MCNC: 87 MG/DL (ref 0–150)
VLDLC SERPL CALC-MCNC: 16 MG/DL (ref 5–40)

## 2021-05-20 ENCOUNTER — OFFICE VISIT (OUTPATIENT)
Dept: FAMILY MEDICINE CLINIC | Facility: CLINIC | Age: 36
End: 2021-05-20

## 2021-05-20 VITALS
OXYGEN SATURATION: 97 % | DIASTOLIC BLOOD PRESSURE: 81 MMHG | HEART RATE: 85 BPM | WEIGHT: 193.3 LBS | TEMPERATURE: 98.2 F | BODY MASS INDEX: 29.3 KG/M2 | SYSTOLIC BLOOD PRESSURE: 120 MMHG | HEIGHT: 68 IN

## 2021-05-20 DIAGNOSIS — D56.5 HEMOGLOBIN E-BETA THALASSEMIA (HCC): Chronic | ICD-10-CM

## 2021-05-20 DIAGNOSIS — E78.2 MIXED HYPERLIPIDEMIA: Chronic | ICD-10-CM

## 2021-05-20 DIAGNOSIS — R73.03 PREDIABETES: Primary | Chronic | ICD-10-CM

## 2021-05-20 PROCEDURE — 99214 OFFICE O/P EST MOD 30 MIN: CPT | Performed by: NURSE PRACTITIONER

## 2021-05-20 RX ORDER — PRAVASTATIN SODIUM 40 MG
40 TABLET ORAL NIGHTLY
Qty: 90 TABLET | Refills: 1 | Status: SHIPPED | OUTPATIENT
Start: 2021-05-20 | End: 2021-09-23

## 2021-05-20 RX ORDER — METFORMIN HYDROCHLORIDE 500 MG/1
500 TABLET, EXTENDED RELEASE ORAL
Qty: 90 TABLET | Refills: 1 | Status: SHIPPED | OUTPATIENT
Start: 2021-05-20 | End: 2021-09-22

## 2021-05-20 NOTE — PATIENT INSTRUCTIONS
Prediabetes Eating Plan  Prediabetes is a condition that causes blood sugar (glucose) levels to be higher than normal. This increases the risk for developing diabetes. In order to prevent diabetes from developing, your health care provider may recommend a diet and other lifestyle changes to help you:  · Control your blood glucose levels.  · Improve your cholesterol levels.  · Manage your blood pressure.  Your health care provider may recommend working with a diet and nutrition specialist (dietitian) to make a meal plan that is best for you.  What are tips for following this plan?  Lifestyle  · Set weight loss goals with the help of your health care team. It is recommended that most people with prediabetes lose 7% of their current body weight.  · Exercise for at least 30 minutes at least 5 days a week.  · Attend a support group or seek ongoing support from a mental health counselor.  · Take over-the-counter and prescription medicines only as told by your health care provider.  Reading food labels  · Read food labels to check the amount of fat, salt (sodium), and sugar in prepackaged foods. Avoid foods that have:  ? Saturated fats.  ? Trans fats.  ? Added sugars.  · Avoid foods that have more than 300 milligrams (mg) of sodium per serving. Limit your daily sodium intake to less than 2,300 mg each day.  Shopping  · Avoid buying pre-made and processed foods.  Cooking  · Cook with olive oil. Do not use butter, lard, or ghee.  · Bake, broil, grill, or boil foods. Avoid frying.  Meal planning    · Work with your dietitian to develop an eating plan that is right for you. This may include:  ? Tracking how many calories you take in. Use a food diary, notebook, or mobile application to track what you eat at each meal.  ? Using the glycemic index (GI) to plan your meals. The index tells you how quickly a food will raise your blood glucose. Choose low-GI foods. These foods take a longer time to raise blood glucose.  · Consider  following a Mediterranean diet. This diet includes:  ? Several servings each day of fresh fruits and vegetables.  ? Eating fish at least twice a week.  ? Several servings each day of whole grains, beans, nuts, and seeds.  ? Using olive oil instead of other fats.  ? Moderate alcohol consumption.  ? Eating small amounts of red meat and whole-fat dairy.  · If you have high blood pressure, you may need to limit your sodium intake or follow a diet such as the DASH eating plan. DASH is an eating plan that aims to lower high blood pressure.  What foods are recommended?  The items listed below may not be a complete list. Talk with your dietitian about what dietary choices are best for you.  Grains  Whole grains, such as whole-wheat or whole-grain breads, crackers, cereals, and pasta. Unsweetened oatmeal. Bulgur. Barley. Quinoa. Brown rice. Corn or whole-wheat flour tortillas or taco shells.  Vegetables  Lettuce. Spinach. Peas. Beets. Cauliflower. Cabbage. Broccoli. Carrots. Tomatoes. Squash. Eggplant. Herbs. Peppers. Onions. Cucumbers. [place]o sprouts.  Fruits  Berries. Bananas. Apples. Oranges. Grapes. Papaya. Jagual. Pomegranate. Kiwi. Grapefruit. Cherries.  Meats and other protein foods  Seafood. Poultry without skin. Lean cuts of pork and beef. Tofu. Eggs. Nuts. Beans.  Dairy  Low-fat or fat-free dairy products, such as yogurt, cottage cheese, and cheese.  Beverages  Water. Tea. Coffee. Sugar-free or diet soda. [place] water. Lowfat or no-fat milk. Milk alternatives, such as soy or almond milk.  Fats and oils  Olive oil. Canola oil. Sunflower oil. Grapeseed oil. Avocado. Walnuts.  Sweets and desserts  Sugar-free or low-fat pudding. Sugar-free or low-fat ice cream and other frozen treats.  Seasoning and other foods  Herbs. Sodium-free spices. Mustard. Relish. Low-fat, low-sugar ketchup. Low-fat, low-sugar barbecue sauce. Low-fat or fat-free mayonnaise.  What foods are not recommended?  The items listed below may not be a  complete list. Talk with your dietitian about what dietary choices are best for you.  Grains  Refined white flour and flour products, such as bread, pasta, snack foods, and cereals.  Vegetables  Canned vegetables. Frozen vegetables with butter or cream sauce.  Fruits  Fruits canned with syrup.  Meats and other protein foods  Fatty cuts of meat. Poultry with skin. Breaded or fried meat. Processed meats.  Dairy  Full-fat yogurt, cheese, or milk.  Beverages  Sweetened drinks, such as sweet iced tea and soda.  Fats and oils  Butter. Lard. Ghee.  Sweets and desserts  Baked goods, such as cake, cupcakes, pastries, cookies, and cheesecake.  Seasoning and other foods  Spice mixes with added salt. Ketchup. Barbecue sauce. Mayonnaise.  Summary  · To prevent diabetes from developing, you may need to make diet and other lifestyle changes to help control blood sugar, improve cholesterol levels, and manage your blood pressure.  · Set weight loss goals with the help of your health care team. It is recommended that most people with prediabetes lose 7 percent of their current body weight.  · Consider following a Mediterranean diet that includes plenty of fresh fruits and vegetables, whole grains, beans, nuts, seeds, fish, lean meat, low-fat dairy, and healthy oils.  This information is not intended to replace advice given to you by your health care provider. Make sure you discuss any questions you have with your health care provider.  Document Revised: 04/10/2020 Document Reviewed: 02/21/2018  [employer] Patient Education © 2021 [employer] Inc.

## 2021-05-20 NOTE — PROGRESS NOTES
"Chief Complaint  Abnormal Lab (f/u lab results )    Viridiana Edwards presents to [facility]  History of Present Illness   Pt is here to f/u on prediabetes, HLD, and newly dx thalassemia trait. Has been doing well since last visit.    Prediabetes: has strong FH of diabetes.  Never been on meds.  Tries to eat a pretty healthy diet, avoid sugars when possible.   HLD:  Taking and sim statin.  He would like to continue this.      Has not had cigarette in 1 week, but does not guarantee that he would not have one soon.    Bronchitis sx went away, but he does notice that he feels hot and has a mile HA for last week or so-not taking anything.  Denies seasonal allergies.     Denies fatigue, s/s hyper/hypoglycemia, chest pain, palpitations, dyspnea, cough, runny nose, congestion, sore throat, myalgia, belly pain, stool changes.       Objective   Vital Signs:   /81   Pulse 85   Temp 98.2 °F (36.8 °C)   Ht 172.7 cm (68\")   Wt 87.7 kg (193 lb 4.8 oz)   SpO2 97%   BMI 29.39 kg/m²     Physical Exam  Vitals and nursing note reviewed.   Constitutional:       General: He is not in acute distress.     Appearance: He is well-developed. He is not ill-appearing or diaphoretic.   HENT:      Head: Normocephalic and atraumatic.   Eyes:      General:         Right eye: No discharge.         Left eye: No discharge.      Conjunctiva/sclera: Conjunctivae normal.   Cardiovascular:      Rate and Rhythm: Normal rate and regular rhythm.      Heart sounds: Normal heart sounds.   Pulmonary:      Effort: Pulmonary effort is normal.      Breath sounds: Normal breath sounds.   Abdominal:      General: Bowel sounds are normal.      Palpations: Abdomen is soft.      Tenderness: There is no abdominal tenderness.   Musculoskeletal:         General: No deformity.      Comments: Gait smooth and steady   Skin:     General: Skin is warm and dry.   Neurological:      General: No focal deficit present.     "  Mental Status: He is alert and oriented to person, place, and time.   Psychiatric:         Mood and Affect: Mood normal.         Behavior: Behavior normal.        Result Review :   The following data was reviewed by: NETO Cain on 05/20/2021:  Lipid Panel    Lipid Panel 2/9/21 4/30/21   Total Cholesterol 261 (A) 196   Triglycerides 187 (A) 87   HDL Cholesterol 40 43   VLDL Cholesterol 35 16   LDL Cholesterol  186 (A) 137 (A)   LDL/HDL Ratio 4.59 3.15   (A) Abnormal value       Comments are available for some flowsheets but are not being displayed.           Most Recent A1C    HGBA1C Most Recent 4/30/21   Hemoglobin A1C 5.80 (A)   (A) Abnormal value       Comments are available for some flowsheets but are not being displayed.                     Assessment and Plan {CC Problem List  Visit Diagnosis   ROS  Review (Popup)  Health Maintenance  Quality  BestPractice  Medications  SmartSets  SnapShot Encounters  Media :23}   Diagnoses and all orders for this visit:    1. Prediabetes (Primary)  -     Hemoglobin A1c; Future  -     metFORMIN ER (GLUCOPHAGE-XR) 500 MG 24 hr tablet; Take 1 tablet by mouth Daily With Breakfast.  Dispense: 90 tablet; Refill: 1    2. Mixed hyperlipidemia  -     pravastatin (PRAVACHOL) 40 MG tablet; Take 1 tablet by mouth Every Night.  Dispense: 90 tablet; Refill: 1    3. Hemoglobin E-beta thalassemia (CMS/HCC)      Stable prediabetes with strong FH:  Diet discussed.  Pt is willing to start low dose metformin.  Side effects discussed.  Will recheck A1C in 3 months, if improved or stable will go to 6 month recheck.  With strong FH I think this is the best course of action.     HLD:  Lipids improved on statin.  Pt requests to stay on statin and with metabolic sx it will probably offer benefit-will continue current dose.      Newly dx Thalasemmia E trait discussed. Nothing to do-explains lab work.      Diet discussed. Congratulated for going 1 week without smoking.  High  pollen counts discussed, common seasonal allergies sx discussed.         Follow Up   Return in about 3 months (around 8/20/2021).  Patient was given instructions and counseling regarding his condition or for health maintenance advice. Please see specific information pulled into the AVS if appropriate.

## 2021-08-13 DIAGNOSIS — R73.03 PREDIABETES: Chronic | ICD-10-CM

## 2021-08-21 LAB — HBA1C MFR BLD: 5.6 % (ref 4.8–5.6)

## 2021-09-07 DIAGNOSIS — E78.2 MIXED HYPERLIPIDEMIA: Primary | ICD-10-CM

## 2021-09-07 DIAGNOSIS — R73.03 PREDIABETES: ICD-10-CM

## 2021-09-22 ENCOUNTER — OFFICE VISIT (OUTPATIENT)
Dept: FAMILY MEDICINE CLINIC | Facility: CLINIC | Age: 36
End: 2021-09-22

## 2021-09-22 VITALS
SYSTOLIC BLOOD PRESSURE: 108 MMHG | BODY MASS INDEX: 30.01 KG/M2 | DIASTOLIC BLOOD PRESSURE: 62 MMHG | OXYGEN SATURATION: 96 % | RESPIRATION RATE: 17 BRPM | WEIGHT: 198 LBS | HEART RATE: 117 BPM | HEIGHT: 68 IN

## 2021-09-22 DIAGNOSIS — R73.03 PREDIABETES: ICD-10-CM

## 2021-09-22 DIAGNOSIS — R00.0 RAPID HEART BEAT: ICD-10-CM

## 2021-09-22 DIAGNOSIS — M94.0 COSTOCHONDRITIS, ACUTE: Primary | ICD-10-CM

## 2021-09-22 DIAGNOSIS — E78.2 MIXED HYPERLIPIDEMIA: ICD-10-CM

## 2021-09-22 PROCEDURE — 93000 ELECTROCARDIOGRAM COMPLETE: CPT | Performed by: NURSE PRACTITIONER

## 2021-09-22 PROCEDURE — 99214 OFFICE O/P EST MOD 30 MIN: CPT | Performed by: NURSE PRACTITIONER

## 2021-09-22 RX ORDER — MELOXICAM 7.5 MG/1
7.5 TABLET ORAL DAILY
Qty: 30 TABLET | Refills: 2 | Status: SHIPPED | OUTPATIENT
Start: 2021-09-22

## 2021-09-22 NOTE — PROGRESS NOTES
"Chief Complaint  Chest Pain (right side pain)    Viridiana Edwards presents to [facility]  History of Present Illness here with spouse for   Right rib pain x 2 weeks-has had costal chondritis in past and not sure if this is cause.  He does have some anxiety about this.  Comes and goes, worse with position changes, sitting, laying.  No recent trauma.  No pleuritic pain.  No fever, chills, cough, dyspnea. Wonders if his high cholesterol could be causing sx.     Smart watch has documented his HR going up to sometimes as high as 160 lasting for few minutes and then resolving.  It happens without regard to activity.  He does not think he is anxious when this occurs.  Does not have any sx and only is aware due to watch.  Not had this in past as far as he knows. No chest pain, palpitations.       Objective   Vital Signs:   /62   Pulse 117   Resp 17   Ht 172.7 cm (68\")   Wt 89.8 kg (198 lb)   SpO2 96%   BMI 30.11 kg/m²     Physical Exam  Vitals and nursing note reviewed.   Constitutional:       General: He is not in acute distress.     Appearance: He is well-developed. He is not ill-appearing or diaphoretic.   HENT:      Head: Normocephalic and atraumatic.   Eyes:      General:         Right eye: No discharge.         Left eye: No discharge.      Conjunctiva/sclera: Conjunctivae normal.   Cardiovascular:      Rate and Rhythm: Regular rhythm. Tachycardia present.      Pulses: Normal pulses.      Heart sounds: Normal heart sounds. No murmur heard.     Pulmonary:      Effort: Pulmonary effort is normal.      Breath sounds: Normal breath sounds. No wheezing or rhonchi.   Abdominal:      General: Bowel sounds are normal.      Palpations: Abdomen is soft.      Tenderness: There is no abdominal tenderness.   Musculoskeletal:         General: No deformity.      Cervical back: Neck supple.      Comments: Right anterior lower ribs with reproducible TTP, no crepitus, obvious " deformity   Lymphadenopathy:      Cervical: No cervical adenopathy.   Skin:     General: Skin is warm and dry.   Neurological:      Mental Status: He is alert and oriented to person, place, and time.   Psychiatric:         Mood and Affect: Mood normal.         Behavior: Behavior normal.        Result Review :            ECG 12 Lead    Date/Time: 9/22/2021 3:24 PM  Performed by: Jennifer Lehman APRN  Authorized by: Jennifer Lehman APRN   Comparison: compared with previous ECG from 2/9/2021  Comparison to previous ECG: Previous did not show tachycardia-HR 62.  Did have non specific T wave abnl similar to today  Rhythm: sinus tachycardia  Rate: tachycardic  Conduction: conduction normal  QRS axis: normal  Other findings: T wave abnormality    Clinical impression: abnormal EKG              Assessment and Plan    Diagnoses and all orders for this visit:    1. Costochondritis, acute (Primary)  -     meloxicam (MOBIC) 7.5 MG tablet; Take 1 tablet by mouth Daily. Take with food  Dispense: 30 tablet; Refill: 2    2. Rapid heart beat  -     Holter monitor - 48 hour; Future  -     ECG 12 Lead    3. Mixed hyperlipidemia    4. Prediabetes      Right chest pain seems c/w costochondritis.  Will start mobic with food.  Side effects discussed.  S/s complications that need f/u discussed.     Tachycardia:  This is a new finding for pt.  Does not seem to be related to anxiety.  Will get holter monitor and then will likely need cardiology referral.  We discussed s/s requiring emergent tx.     HLD:  Wishes to continue statin-taking and sim without side effects.  Most likely improves his anxiety related to tachycardia.  Side effects and diet discussed.      Prediabetes:  Not on meds-prefers diet.  Discussed.  Recheck this and lipids in 3 months.       Follow Up   Return in about 3 months (around 12/22/2021) for Recheck.  Patient was given instructions and counseling regarding his condition or for health maintenance advice. Please  see specific information pulled into the AVS if appropriate.

## 2021-09-23 RX ORDER — ATORVASTATIN CALCIUM 40 MG/1
40 TABLET, FILM COATED ORAL NIGHTLY
Qty: 30 TABLET | Refills: 3 | Status: SHIPPED | OUTPATIENT
Start: 2021-09-23 | End: 2022-03-03

## 2021-12-26 ENCOUNTER — TELEMEDICINE (OUTPATIENT)
Dept: FAMILY MEDICINE CLINIC | Facility: TELEHEALTH | Age: 36
End: 2021-12-26

## 2021-12-26 DIAGNOSIS — J06.9 UPPER RESPIRATORY TRACT INFECTION, UNSPECIFIED TYPE: Primary | ICD-10-CM

## 2021-12-26 PROCEDURE — 99213 OFFICE O/P EST LOW 20 MIN: CPT | Performed by: NURSE PRACTITIONER

## 2021-12-26 RX ORDER — AZITHROMYCIN 250 MG/1
TABLET, FILM COATED ORAL
Qty: 6 TABLET | Refills: 0 | Status: SHIPPED | OUTPATIENT
Start: 2021-12-26 | End: 2021-12-26

## 2021-12-26 RX ORDER — AZITHROMYCIN 250 MG/1
TABLET, FILM COATED ORAL
Qty: 6 TABLET | Refills: 0 | Status: SHIPPED | OUTPATIENT
Start: 2021-12-26

## 2021-12-26 NOTE — PATIENT INSTRUCTIONS
Cough, Adult  A cough helps to clear your throat and lungs. A cough may be a sign of an illness or another medical condition.  An acute cough may only last 2-3 weeks, while a chronic cough may last 8 or more weeks.  Many things can cause a cough. They include:  · Germs (viruses or bacteria) that attack the airway.  · Breathing in things that bother (irritate) your lungs.  · Allergies.  · Asthma.  · Mucus that runs down the back of your throat (postnasal drip).  · Smoking.  · Acid backing up from the stomach into the tube that moves food from the mouth to the stomach (gastroesophageal reflux).  · Some medicines.  · Lung problems.  · Other medical conditions, such as heart failure or a blood clot in the lung (pulmonary embolism).  Follow these instructions at home:  Medicines  · Take over-the-counter and prescription medicines only as told by your doctor.  · Talk with your doctor before you take medicines that stop a cough (cough suppressants).  Lifestyle    · Do not smoke, and try not to be around smoke. Do not use any products that contain nicotine or tobacco, such as cigarettes, e-cigarettes, and chewing tobacco. If you need help quitting, ask your doctor.  · Drink enough fluid to keep your pee (urine) pale yellow.  · Avoid caffeine.  · Do not drink alcohol if your doctor tells you not to drink.    General instructions    · Watch for any changes in your cough. Tell your doctor about them.  · Always cover your mouth when you cough.  · Stay away from things that make you cough, such as perfume, candles, campfire smoke, or cleaning products.  · If the air is dry, use a cool mist vaporizer or humidifier in your home.  · If your cough is worse at night, try using extra pillows to raise your head up higher while you sleep.  · Rest as needed.  · Keep all follow-up visits as told by your doctor. This is important.    Contact a doctor if:  · You have new symptoms.  · You cough up pus.  · Your cough does not get better after  "2-3 weeks, or your cough gets worse.  · Cough medicine does not help your cough and you are not sleeping well.  · You have pain that gets worse or pain that is not helped with medicine.  · You have a fever.  · You are losing weight and you do not know why.  · You have night sweats.  Get help right away if:  · You cough up blood.  · You have trouble breathing.  · Your heartbeat is very fast.  These symptoms may be an emergency. Do not wait to see if the symptoms will go away. Get medical help right away. Call your local emergency services (911 in the [place] Do not drive yourself to the hospital.  Summary  · A cough helps to clear your throat and lungs. Many things can cause a cough.  · Take over-the-counter and prescription medicines only as told by your doctor.  · Always cover your mouth when you cough.  · Contact a doctor if you have new symptoms or you have a cough that does not get better or gets worse.  This information is not intended to replace advice given to you by your health care provider. Make sure you discuss any questions you have with your health care provider.  Document Revised: 02/05/2021 Document Reviewed: 01/06/2020  Elsevier Patient Education © 2021 [employer]      Upper Respiratory Infection, Adult  An upper respiratory infection (URI) affects the nose, throat, and upper air passages. URIs are caused by germs (viruses). The most common type of URI is often called \"the common cold.\"  Medicines cannot cure URIs, but you can do things at home to relieve your symptoms. URIs usually get better within 7-10 days.  Follow these instructions at home:  Activity  · Rest as needed.  · If you have a fever, stay home from work or school until your fever is gone, or until your doctor says you may return to work or school.  ? You should stay home until you cannot spread the infection anymore (you are not contagious).  ? Your doctor may have you wear a face mask so you have less risk of spreading the " infection.  Relieving symptoms  · Gargle with a salt-water mixture 3-4 times a day or as needed. To make a salt-water mixture, completely dissolve ½-1 tsp of salt in 1 cup of warm water.  · Use a cool-mist humidifier to add moisture to the air. This can help you breathe more easily.  Eating and drinking    · Drink enough fluid to keep your pee (urine) pale yellow.  · Eat soups and other clear broths.    General instructions    · Take over-the-counter and prescription medicines only as told by your doctor. These include cold medicines, fever reducers, and cough suppressants.  · Do not use any products that contain nicotine or tobacco. These include cigarettes and e-cigarettes. If you need help quitting, ask your doctor.  · Avoid being where people are smoking (avoid secondhand smoke).  · Make sure you get regular shots and get the flu shot every year.  · Keep all follow-up visits as told by your doctor. This is important.    How to avoid spreading infection to others    · Wash your hands often with soap and water. If you do not have soap and water, use hand .  · Avoid touching your mouth, face, eyes, or nose.  · Cough or sneeze into a tissue or your sleeve or elbow. Do not cough or sneeze into your hand or into the air.    Contact a doctor if:  · You are getting worse, not better.  · You have any of these:  ? A fever.  ? Chills.  ? Brown or red mucus in your nose.  ? Yellow or brown fluid (discharge)coming from your nose.  ? Pain in your face, especially when you bend forward.  ? Swollen neck glands.  ? Pain with swallowing.  ? White areas in the back of your throat.  Get help right away if:  · You have shortness of breath that gets worse.  · You have very bad or constant:  ? Headache.  ? Ear pain.  ? Pain in your forehead, behind your eyes, and over your cheekbones (sinus pain).  ? Chest pain.  · You have long-lasting (chronic) lung disease along with any of these:  ? Wheezing.  ? Long-lasting  "cough.  ? Coughing up blood.  ? A change in your usual mucus.  · You have a stiff neck.  · You have changes in your:  ? Vision.  ? Hearing.  ? Thinking.  ? Mood.  Summary  · An upper respiratory infection (URI) is caused by a germ called a virus. The most common type of URI is often called \"the common cold.\"  · URIs usually get better within 7-10 days.  · Take over-the-counter and prescription medicines only as told by your doctor.  This information is not intended to replace advice given to you by your health care provider. Make sure you discuss any questions you have with your health care provider.  Document Revised: 12/26/2019 Document Reviewed: 08/10/2018  Elsevier Patient Education © 2021 [employer]    "

## 2021-12-26 NOTE — PROGRESS NOTES
You have chosen to receive care through a telehealth visit.  Do you consent to use a video/audio connection for your medical care today? Yes     CHIEF COMPLAINT  Chief Complaint   Patient presents with   • Cough   • Nasal Congestion   • Fever         HPI  Sauld Edwards is a 36 y.o. male  presents with complaint of upper respiratory congestion and cough for 10 days. He has a low grade fever with yellow cough production, runny nose and malaise. He has tested negative for covid and declines a PCR test today. He is fully vaccinated.   He is using Bromfed DM for cough which is not helping. He denies shortness of breath or wheezing.     Review of Systems   Constitutional: Positive for fatigue and fever.   HENT: Positive for congestion, postnasal drip and rhinorrhea.    Respiratory: Positive for cough. Negative for shortness of breath, wheezing and stridor.    Cardiovascular: Negative.    Gastrointestinal: Negative.    Genitourinary: Negative.    Neurological: Negative.    Hematological: Negative.    Psychiatric/Behavioral: Negative.        History reviewed. No pertinent past medical history.    Family History   Problem Relation Age of Onset   • Diabetes Mother    • Hypertension Mother    • Diabetes Father    • OCD Father    • Hypertension Father    • Hyperlipidemia Father    • Anxiety disorder Father    • Anxiety disorder Brother    • OCD Brother        Social History     Socioeconomic History   • Marital status:    Tobacco Use   • Smoking status: Current Every Day Smoker     Packs/day: 0.25     Years: 12.00     Pack years: 3.00   • Smokeless tobacco: Never Used   Vaping Use   • Vaping Use: Never used   Substance and Sexual Activity   • Alcohol use: Not Currently   • Drug use: Never   • Sexual activity: Yes     Partners: Female     Comment: spouse         There were no vitals taken for this visit.    PHYSICAL EXAM  Physical Exam   Constitutional: He is oriented to person, place, and time. He appears well-nourished.  He does not have a sickly appearance. He does not appear ill. No distress.   HENT:   Head: Normocephalic and atraumatic.   Right Ear: Hearing normal.   Left Ear: Hearing normal.   Nose: Mucosal edema and congestion present.   Mouth/Throat: Mouth/Lips are normal.  Pulmonary/Chest: Effort normal.  No respiratory distress.  Neurological: He is alert and oriented to person, place, and time.   Psychiatric: He has a normal mood and affect.   Vitals reviewed.      Results for orders placed or performed in visit on 09/12/21   Basic metabolic panel    Specimen: Blood   Result Value Ref Range    Glucose 86 65 - 99 mg/dL    BUN 18 6 - 20 mg/dL    Creatinine 0.95 0.76 - 1.27 mg/dL    eGFR Non African Am 90 >60 mL/min/1.73    eGFR African Am 109 >60 mL/min/1.73    BUN/Creatinine Ratio 18.9 7.0 - 25.0    Sodium 140 136 - 145 mmol/L    Potassium 4.5 3.5 - 5.2 mmol/L    Chloride 104 98 - 107 mmol/L    Total CO2 25.6 22.0 - 29.0 mmol/L    Calcium 9.8 8.6 - 10.5 mg/dL   Lipid Panel With LDL/HDL Ratio    Specimen: Blood   Result Value Ref Range    Total Cholesterol 216 (H) 0 - 200 mg/dL    Triglycerides 254 (H) 0 - 150 mg/dL    HDL Cholesterol 35 (L) 40 - 60 mg/dL    VLDL Cholesterol George 46 (H) 5 - 40 mg/dL    LDL Chol Calc (NIH) 135 (H) 0 - 100 mg/dL    LDL/HDL RATIO 3.72        Diagnoses and all orders for this visit:    1. Upper respiratory tract infection, unspecified type (Primary)  -     azithromycin (Zithromax Z-Maurice) 250 MG tablet; Take 2 tablets by mouth on day 1, then 1 tablet daily on days 2-5  Dispense: 6 tablet; Refill: 0    Mucinex dm as directed daily  Increase fluids and rest  Nasal saline prn congestion. Follow up prn worsening s/s .   Patient to report a rapid home covid test today.       FOLLOW-UP  As discussed during visit with PCP/[facility] if no improvement or Urgent Care/Emergency Department if worsening of symptoms    Patient verbalizes understanding of medication dosage, comfort measures, instructions for  treatment and follow-up.    Elva Hurtado, NETO  12/26/2021  16:01 EST    This visit was performed via Telehealth.  This patient has been instructed to follow-up with their primary care provider if their symptoms worsen or the treatment provided does not resolve their illness.

## 2022-03-03 RX ORDER — ATORVASTATIN CALCIUM 40 MG/1
TABLET, FILM COATED ORAL
Qty: 30 TABLET | Refills: 3 | Status: SHIPPED | OUTPATIENT
Start: 2022-03-03 | End: 2022-07-07 | Stop reason: SDUPTHER

## 2022-03-03 NOTE — TELEPHONE ENCOUNTER
Rx Refill Note  Requested Prescriptions     Pending Prescriptions Disp Refills   • atorvastatin (LIPITOR) 40 MG tablet [Pharmacy Med Name: ATORVASTATIN 40 MG TABLET] 30 tablet 3     Sig: TAKE ONE TABLET BY MOUTH ONCE NIGHTLY      Last office visit with prescribing clinician: 9/22/2021      Next office visit with prescribing clinician: Visit date not found       {TIP  Please add Last Relevant Lab Date if appropriate: 09/22/21    Sheela Temple MA  03/03/22, 10:15 EST

## 2022-07-07 NOTE — TELEPHONE ENCOUNTER
Caller: Salud Edwards    Relationship: Self    Best call back number: [phone] (H)    Requested Prescriptions:   Requested Prescriptions     Pending Prescriptions Disp Refills   • atorvastatin (LIPITOR) 40 MG tablet 30 tablet 3     Sig: Take 1 tablet by mouth Every Night.        Pharmacy where request should be sent: [id number] [place], [place] - [address] AT [facility] [address]  [phone] [facility] [phone] FX     Additional details provided by patient: PATIENT HAS MOVED TO [place] HE HAS ABOUT A MONTH LEFT ON THIS SCRIP AND IS REQUESTING WE SEND A NEW ONE TO [facility] IN [place], LISTED ABOVE    Does the patient have less than a 3 day supply:  [] Yes  [x] No    Loretta Alcantara, ARNOLDO   07/07/22 16:13 EDT

## 2022-07-08 RX ORDER — ATORVASTATIN CALCIUM 40 MG/1
40 TABLET, FILM COATED ORAL NIGHTLY
Qty: 30 TABLET | Refills: 0 | Status: SHIPPED | OUTPATIENT
Start: 2022-07-08

## 2022-07-11 RX ORDER — ATORVASTATIN CALCIUM 40 MG/1
40 TABLET, FILM COATED ORAL NIGHTLY
Qty: 90 TABLET | OUTPATIENT
Start: 2022-07-11

## 2022-07-11 NOTE — TELEPHONE ENCOUNTER
Rx Refill Note  Requested Prescriptions     Pending Prescriptions Disp Refills   • atorvastatin (LIPITOR) 40 MG tablet [Pharmacy Med Name: ATORVASTATIN 40MG TABLETS] 90 tablet      Sig: TAKE 1 TABLET BY MOUTH EVERY NIGHT      Last office visit with prescribing clinician: 9/22/2021      Next office visit with prescribing clinician: Visit date not found            Livier Gustafson MA  07/11/22, 11:27 EDT